# Patient Record
Sex: MALE | Race: OTHER
[De-identification: names, ages, dates, MRNs, and addresses within clinical notes are randomized per-mention and may not be internally consistent; named-entity substitution may affect disease eponyms.]

---

## 2019-11-17 ENCOUNTER — HOSPITAL ENCOUNTER (EMERGENCY)
Dept: HOSPITAL 74 - JER | Age: 54
Discharge: HOME | End: 2019-11-17
Payer: COMMERCIAL

## 2019-11-17 VITALS — BODY MASS INDEX: 31.3 KG/M2

## 2019-11-17 VITALS — HEART RATE: 73 BPM | DIASTOLIC BLOOD PRESSURE: 104 MMHG | SYSTOLIC BLOOD PRESSURE: 140 MMHG

## 2019-11-17 VITALS — TEMPERATURE: 97.6 F

## 2019-11-17 DIAGNOSIS — Z91.14: ICD-10-CM

## 2019-11-17 DIAGNOSIS — N20.0: Primary | ICD-10-CM

## 2019-11-17 DIAGNOSIS — I10: ICD-10-CM

## 2019-11-17 LAB
ALBUMIN SERPL-MCNC: 4.1 G/DL (ref 3.4–5)
ALP SERPL-CCNC: 62 U/L (ref 45–117)
ALT SERPL-CCNC: 80 U/L (ref 13–61)
ANION GAP SERPL CALC-SCNC: 7 MMOL/L (ref 8–16)
APPEARANCE UR: CLEAR
AST SERPL-CCNC: 26 U/L (ref 15–37)
BACTERIA # UR AUTO: 6 /HPF
BILIRUB SERPL-MCNC: 0.7 MG/DL (ref 0.2–1)
BILIRUB UR STRIP.AUTO-MCNC: (no result) MG/DL
BUN SERPL-MCNC: 15.8 MG/DL (ref 7–18)
CALCIUM SERPL-MCNC: 9.1 MG/DL (ref 8.5–10.1)
CASTS URNS QL MICRO: 17 /LPF (ref 0–8)
CHLORIDE SERPL-SCNC: 105 MMOL/L (ref 98–107)
CO2 SERPL-SCNC: 26 MMOL/L (ref 21–32)
COLOR UR: (no result)
CREAT SERPL-MCNC: 1.2 MG/DL (ref 0.55–1.3)
CRYSTALS URNS QL MICRO: (no result) /HPF
DEPRECATED RDW RBC AUTO: 13.6 % (ref 11.9–15.9)
EPITH CASTS URNS QL MICRO: 1.6 /HPF
GLUCOSE SERPL-MCNC: 201 MG/DL (ref 74–106)
HCT VFR BLD CALC: 46.4 % (ref 35.4–49)
HGB BLD-MCNC: 16.1 GM/DL (ref 11.7–16.9)
KETONES UR QL STRIP: (no result)
LEUKOCYTE ESTERASE UR QL STRIP.AUTO: NEGATIVE
LIPASE SERPL-CCNC: 147 U/L (ref 73–393)
MCH RBC QN AUTO: 31.9 PG (ref 25.7–33.7)
MCHC RBC AUTO-ENTMCNC: 34.6 G/DL (ref 32–35.9)
MCV RBC: 92.2 FL (ref 80–96)
NITRITE UR QL STRIP: NEGATIVE
PH UR: 5 [PH] (ref 5–8)
PLATELET # BLD AUTO: 168 K/MM3 (ref 134–434)
PMV BLD: 9.1 FL (ref 7.5–11.1)
POTASSIUM SERPLBLD-SCNC: 3.9 MMOL/L (ref 3.5–5.1)
PROT SERPL-MCNC: 7.9 G/DL (ref 6.4–8.2)
PROT UR QL STRIP: (no result)
PROT UR QL STRIP: (no result)
RBC # BLD AUTO: 3 /HPF (ref 0–4)
RBC # BLD AUTO: 5.03 M/MM3 (ref 4–5.6)
SODIUM SERPL-SCNC: 139 MMOL/L (ref 136–145)
SP GR UR: 1.03 (ref 1.01–1.03)
UROBILINOGEN UR STRIP-MCNC: 1 MG/DL (ref 0.2–1)
WBC # BLD AUTO: 8.4 K/MM3 (ref 4–10)
WBC # UR AUTO: 1 /HPF (ref 0–5)

## 2019-11-17 PROCEDURE — 3E033NZ INTRODUCTION OF ANALGESICS, HYPNOTICS, SEDATIVES INTO PERIPHERAL VEIN, PERCUTANEOUS APPROACH: ICD-10-PCS

## 2019-11-17 PROCEDURE — 3E0333Z INTRODUCTION OF ANTI-INFLAMMATORY INTO PERIPHERAL VEIN, PERCUTANEOUS APPROACH: ICD-10-PCS

## 2019-11-17 NOTE — PDOC
History of Present Illness





- General


Chief Complaint: Pain


Stated Complaint: RIGHT LOWER QUADRANT PAIN


History Source: Patient


Exam Limitations: No Limitations





Past History





- Past Medical History


Allergies/Adverse Reactions: 


 Allergies











Allergy/AdvReac Type Severity Reaction Status Date / Time


 


No Known Allergies Allergy   Verified 11/17/19 06:26











COPD: No





- Psycho Social/Smoking Cessation Hx


Smoking History: Never smoked





*Physical Exam





- Vital Signs


 Last Vital Signs











Temp Pulse Resp BP Pulse Ox


 


 97.6 F   70   20   177/110 H  98 


 


 11/17/19 06:24  11/17/19 06:24  11/17/19 06:24  11/17/19 06:24  11/17/19 06:24














ED Treatment Course





- LABORATORY


CBC & Chemistry Diagram: 


 11/17/19 06:25





 11/17/19 06:25





Discharge





- Discharge Information


Problems reviewed: Yes


Clinical Impression/Diagnosis: 


 Nephrolithiasis





Condition: Good


Disposition: HOME





- Admission


No





- Follow up/Referral


Referrals: 


Dru Ellsworth MD., MD [Staff Physician] - 





- Patient Discharge Instructions


Patient Printed Discharge Instructions:  DI for Kidney Stones


Additional Instructions: 


You were diagnosed with a kidney stone today.  Drink plenty of fluids.  Your 

stone is small enough that it should pass on its own, but you may have 

recurrent pain.





For your pain, you may take Motrin 600mg every 6 hours as needed.  You may also 

take Tylenol if Motrin is not adequately controlling your pain.  Do not take 

more than 4 g of Tylenol per day as this can cause liver damage.





Follow up with the urologist Dr. Ellsworth within 1 week. A referral has been 

included in this packet.





Your blood pressure was elevated in the emergency department. Make sure your 

take your blood pressure medications as prescribed. Follow up with your primary 

doctor within 1 week for a blood pressure check and for further evaluation. 





Return to the emergency department if you have any new, worsening or concerning 

symptoms.








Hoy te diagnosticaron un clculo renal. Beber mucho lquido. Rea clculo es lo 

suficientemente pequeo sesar para pasar por s solo, bree es posible que tenga 

dolor recurrente.





Para rea dolor, puede griffin Motrin 600mg cada 6 horas segn sea necesario. 

Tambin puede griffin Tylenol si Motrin no controla adecuadamente rea dolor. No 

tome ms de 4 g de Tylenol por da ya que esto puede causar dewayne heptico.





[facility] un seguimiento con el urlogo Dr. Ellsworth dentro de 1 semana. Se ha 

incluido jai referencia en vic paquete.





Rea presin arterial fue elevada en el departamento de emergencias. Asegrese de 

griffin marissa medicamentos para la presin arterial segn lo recetado. [facility] un 

seguimiento con rea mdico de cabecera dentro de 1 semana para un control de la 

presin arterial y para jai evaluacin adicional.





Regrese al departamento de emergencias si tiene algn sntoma nuevo, que 

empeora o relacionado.





- Post Discharge Activity


Work/Back to School Note:  Back to Work

## 2019-11-17 NOTE — PDOC
Attending Attestation





- Resident


Resident Name: Aidan Saleem





- ED Attending Attestation


I have performed the following: I have examined & evaluated the patient, The 

case was reviewed & discussed with the resident, I agree w/resident's findings 

& plan, Exceptions are as noted





- HPI


HPI: 





11/17/19 07:04


Agree with resident HPI








- Physicial Exam


PE: 





11/17/19 09:58


Agree with resident exam





- Medical Decision Making





11/17/19 09:59


54-year-old male with a history of hypertension presents to the emergency 

department with sudden onset right lower quadrant pain radiating to the groin.





Vitals remarkable for elevated blood pressure.  Patient reports he has not 

taken his blood pressure medications in a few weeks.





Exam with mild right-sided CVA tenderness.  /Testicular exam within normal 

limits.





Work-up revealing of a 2 to 3 mm right nonobstructing stone almost at the right 

UVJ.  Patient has no white count, no systemic signs of infection.  UA is 

negative for infection.  Creatinine was within normal limits.  His pain has 

resolved with Toradol.





There is no emergent indication to admit patient at this time.  Will refer to 

urology as an outpatient.  Plan explained to patient.  Patient is clinically 

stable for discharge home.





I discussed the physical exam findings, ancillary test results and final 

diagnoses with the patient. I answered all of the patient's questions. The 

patient was satisfied with the care received and felt comfortable with the 

discharge plan and treatment plan. The patient will call their primary care 

physician within 24 hours to arrange follow-up and will return to the Emergency 

Department with any new, persistent or worsening symptoms. 





Discharge





- Discharge Information


Clinical Impression/Diagnosis: 


 Nephrolithiasis, Abdominal pain, Right groin pain





Condition: Improved


Disposition: HOME





- Admission


No





- Follow up/Referral


Referrals: 


Dru Ellsworth MD., MD [Staff Physician] - 





- Patient Discharge Instructions


Patient Printed Discharge Instructions:  DI for Kidney Stones


Additional Instructions: 


You were diagnosed with a kidney stone today.  Drink plenty of fluids.  Your 

stone is small enough that it should pass on its own, but you may have 

recurrent pain.





For your pain, you may take Motrin 600mg every 6 hours as needed.  You may also 

take Tylenol if Motrin is not adequately controlling your pain.  Do not take 

more than 4 g of Tylenol per day as this can cause liver damage.





Follow up with the urologist Dr. Ellsworth within 1 week. A referral has been 

included in this packet.





Your blood pressure was elevated in the emergency department. Make sure your 

take your blood pressure medications as prescribed. Follow up with your primary 

doctor within 1 week for a blood pressure check and for further evaluation. 





Return to the emergency department if you have any new, worsening or concerning 

symptoms.








Hoy te diagnosticaron un clculo renal. Beber mucho lquido. Rea clculo es lo 

suficientemente pequeo sesar para pasar por s solo, bree es posible que tenga 

dolor recurrente.





Para rea dolor, puede griffin Motrin 600mg cada 6 horas segn sea necesario. 

Tambin puede griffin Tylenol si Motrin no controla adecuadamente rea dolor. No 

tome ms de 4 g de Tylenol por da ya que esto puede causar dewayne heptico.





[facility] un seguimiento con el urlogo Dr. Ellsworth dentro de 1 semana. Se ha 

incluido jai referencia en vic paquete.





Rea presin arterial fue elevada en el departamento de emergencias. Asegrese de 

griffin marissa medicamentos para la presin arterial segn lo recetado. [facility] un 

seguimiento con rea mdico de cabecera dentro de 1 semana para un control de la 

presin arterial y para jai evaluacin adicional.





Regrese al departamento de emergencias si tiene algn sntoma nuevo, que 

empeora o relacionado.





- Post Discharge Activity


Work/Back to School Note:  Back to Work

## 2019-11-17 NOTE — EKG
Test Reason : 

Blood Pressure : ***/*** mmHG

Vent. Rate : 060 BPM     Atrial Rate : 060 BPM

   P-R Int : 128 ms          QRS Dur : 082 ms

    QT Int : 408 ms       P-R-T Axes : -06 [phone] degrees

   QTc Int : 408 ms

 

NORMAL SINUS RHYTHM

MINIMAL VOLTAGE CRITERIA FOR LVH, MAY BE NORMAL VARIANT

NONSPECIFIC T WAVE ABNORMALITY

ABNORMAL ECG

NO PREVIOUS ECGS AVAILABLE

Confirmed by MONA WELLINGTON MD ([id number]) on 11/17/2019 4:54:55 PM

 

Referred By:             Confirmed By:MONA WELLINGTON MD

## 2023-11-03 ENCOUNTER — OFFICE (OUTPATIENT)
Dept: URBAN - METROPOLITAN AREA CLINIC 30 | Facility: CLINIC | Age: 58
Setting detail: OPHTHALMOLOGY
End: 2023-11-03
Payer: COMMERCIAL

## 2023-11-03 DIAGNOSIS — H04.203: ICD-10-CM

## 2023-11-03 DIAGNOSIS — H43.813: ICD-10-CM

## 2023-11-03 DIAGNOSIS — E11.9: ICD-10-CM

## 2023-11-03 PROCEDURE — 92250 FUNDUS PHOTOGRAPHY W/I&R: CPT | Performed by: OPHTHALMOLOGY

## 2023-11-03 PROCEDURE — 92004 COMPRE OPH EXAM NEW PT 1/>: CPT | Performed by: OPHTHALMOLOGY

## 2023-11-03 ASSESSMENT — CONFRONTATIONAL VISUAL FIELD TEST (CVF)
OS_FINDINGS: FULL
OD_FINDINGS: FULL

## 2023-11-03 ASSESSMENT — REFRACTION_CURRENTRX
OD_SPHERE: +1.75
OD_OVR_VA: 20/
OS_OVR_VA: 20/

## 2023-11-03 ASSESSMENT — SPHEQUIV_DERIVED
OD_SPHEQUIV: 0.375
OS_SPHEQUIV: 0.375

## 2023-11-03 ASSESSMENT — REFRACTION_MANIFEST
OS_SPHERE: PLANO
OD_SPHERE: PLANO
OD_ADD: +1.75
OS_ADD: +1.75

## 2023-11-03 ASSESSMENT — REFRACTION_AUTOREFRACTION
OD_CYLINDER: +1.25
OD_SPHERE: -0.25
OS_AXIS: 180
OS_CYLINDER: +0.75
OD_AXIS: 175
OS_SPHERE: 0.00